# Patient Record
Sex: FEMALE | Race: WHITE
[De-identification: names, ages, dates, MRNs, and addresses within clinical notes are randomized per-mention and may not be internally consistent; named-entity substitution may affect disease eponyms.]

---

## 2018-09-08 ENCOUNTER — HOSPITAL ENCOUNTER (EMERGENCY)
Dept: HOSPITAL 25 - UCCORT | Age: 23
Discharge: HOME | End: 2018-09-08
Payer: COMMERCIAL

## 2018-09-08 VITALS — SYSTOLIC BLOOD PRESSURE: 130 MMHG | DIASTOLIC BLOOD PRESSURE: 90 MMHG

## 2018-09-08 DIAGNOSIS — H65.90: Primary | ICD-10-CM

## 2018-09-08 DIAGNOSIS — J06.9: ICD-10-CM

## 2018-09-08 PROCEDURE — G0463 HOSPITAL OUTPT CLINIC VISIT: HCPCS

## 2018-09-08 PROCEDURE — 99202 OFFICE O/P NEW SF 15 MIN: CPT

## 2018-09-08 PROCEDURE — 96372 THER/PROPH/DIAG INJ SC/IM: CPT

## 2018-09-08 NOTE — UC
Throat Pain/Nasal Nikolai HPI





- HPI Summary


HPI Summary: 





Patient has been feelins sick the past week, during a [employer] practice 5 

days ago, she became very dizzy, her heart was racing and the [facility] sent her to 

the ER for evaluation. Her cardiac wrokup was negative, strep and mono was 

negative. She played field hockey the past few days. TOday her throat is sore 

and she feels like her glands are swollen. 





- History of Current Complaint


Stated Complaint: SWOLLEN GLANDS


Time Seen by Provider: 09/08/18 21:55


Hx Obtained From: Patient


Pregnant?: No


Onset/Duration: Sudden Onset, Lasting Weeks - 1


Severity: Moderate


Cough: None


Associated Signs & Symptoms: Positive: Dysphagia





- Allergies/Home Medications


Allergies/Adverse Reactions: 


 Allergies











Allergy/AdvReac Type Severity Reaction Status Date / Time


 


fur bearing animals Allergy  Hives Uncoded 09/08/18 22:12











Home Medications: 


 Home Medications





Oc  1 tab PO DAILY 09/08/18 [History Confirmed 09/08/18]











PMH/Surg Hx/FS Hx/Imm Hx


Previously Healthy: Yes





- Family History


Known Family History: Positive: Cardiac Disease, Hypertension





Review of Systems


Constitutional: Fatigue


Skin: Negative


Eyes: Negative


ENT: Sore Throat, Sinus Congestion


Respiratory: Cough


Cardiovascular: Negative


Gastrointestinal: Negative


Genitourinary: Negative


Motor: Negative


Neurovascular: Negative


Musculoskeletal: Negative


Neurological: Negative


Psychological: Negative


Is Patient Immunocompromised?: No


All Other Systems Reviewed And Are Negative: Yes





Physical Exam


Triage Information Reviewed: Yes


Appearance: Well-Nourished, Ill-Appearing, Pain Distress


Vital Signs Reviewed: Yes


Eye Exam: Normal


ENT: Positive: Pharyngeal erythema, TM bulging - bilateral


Dental Exam: Normal


Neck exam: Normal


Neck: Positive: Supple, Nontender, No Lymphadenopathy


Respiratory Exam: Normal


Respiratory: Positive: Chest non-tender, Lungs clear, Normal breath sounds


Cardiovascular Exam: Normal


Cardiovascular: Positive: RRR, No Murmur, Pulses Normal


Abdominal Exam: Normal


Abdomen Description: Positive: Nontender, No Organomegaly, Soft, CVA Tenderness 

(R) - neg, CVA Tenderness (L) - neg


Bowel Sounds: Positive: Present


Musculoskeletal Exam: Normal


Musculoskeletal: Positive: Strength Intact, ROM Intact, No Edema


Neurological Exam: Normal


Neurological: Positive: Alert


Psychological Exam: Normal


Skin Exam: Normal





Throat Pain/Nasal Course/Dx





- Course


Course Of Treatment: hx obtained, exam performed ,meds reviewed, IM solumedrol 

given and treated for viral URI





- Differential Dx/Diagnosis


Differential Diagnosis/HQI/PQRI: Otitis Media, Pharyngitis, Sinusitis, URI


Provider Diagnoses: serous otitis.  URI





Discharge





- Sign-Out/Discharge


Documenting (check all that apply): Patient Departure


All imaging exams completed and their final reports reviewed: Yes





- Discharge Plan


Condition: Stable


Disposition: HOME


Patient Education Materials:  Viral Syndrome (ED)


Referrals: 


No Primary Care Phys,NOPCP [Primary Care Provider] - 


Additional Instructions: 


1. salt water gargles to help with the sore throat and to kill germs


2. GET PLENTY OF REST!!!!!! this is necessary to let your body fight off 

infection


3. Increase your fluid intake


4. Ibuprofen ( 400 mg every 4 hours) or tylenol 1000 mg every 8 hours for pain 

and fever as needed.


5. FOllow up with your doctor when you return home if symptoms get worse.


6 VIruses typically run 10 - 14 days.





- Billing Disposition and Condition


Condition: STABLE


Disposition: Home